# Patient Record
Sex: FEMALE | Race: WHITE | NOT HISPANIC OR LATINO | Employment: OTHER | URBAN - METROPOLITAN AREA
[De-identification: names, ages, dates, MRNs, and addresses within clinical notes are randomized per-mention and may not be internally consistent; named-entity substitution may affect disease eponyms.]

---

## 2021-08-31 PROBLEM — M35.01 SJOGREN'S SYNDROME WITH KERATOCONJUNCTIVITIS SICCA (HCC): Status: ACTIVE | Noted: 2021-08-31

## 2021-08-31 PROBLEM — M06.09 RHEUMATOID ARTHRITIS OF MULTIPLE SITES WITH NEGATIVE RHEUMATOID FACTOR (HCC): Status: ACTIVE | Noted: 2021-08-31

## 2021-08-31 PROBLEM — M47.816 SPONDYLOSIS OF LUMBAR REGION WITHOUT MYELOPATHY OR RADICULOPATHY: Status: ACTIVE | Noted: 2021-08-31

## 2021-08-31 PROBLEM — M81.0 AGE-RELATED OSTEOPOROSIS WITHOUT CURRENT PATHOLOGICAL FRACTURE: Status: ACTIVE | Noted: 2021-08-31

## 2021-08-31 PROBLEM — K21.9 GASTROESOPHAGEAL REFLUX DISEASE WITHOUT ESOPHAGITIS: Status: ACTIVE | Noted: 2018-05-14

## 2021-08-31 PROBLEM — I10 HYPERTENSION, ESSENTIAL: Status: ACTIVE | Noted: 2018-05-29

## 2021-08-31 PROBLEM — E78.5 HYPERLIPIDEMIA: Status: ACTIVE | Noted: 2018-04-10

## 2021-09-07 PROBLEM — M15.0 PRIMARY GENERALIZED (OSTEO)ARTHRITIS: Status: ACTIVE | Noted: 2021-09-07

## 2021-09-07 PROBLEM — G56.01 CARPAL TUNNEL SYNDROME OF RIGHT WRIST: Status: ACTIVE | Noted: 2021-09-07

## 2021-09-07 PROBLEM — M17.0 PRIMARY OSTEOARTHRITIS OF BOTH KNEES: Status: ACTIVE | Noted: 2021-09-07

## 2022-06-21 PROBLEM — M17.11 PRIMARY OSTEOARTHRITIS OF RIGHT KNEE: Status: ACTIVE | Noted: 2022-06-21

## 2022-10-12 PROBLEM — M35.01 SJOGREN'S SYNDROME WITH KERATOCONJUNCTIVITIS SICCA (HCC): Status: RESOLVED | Noted: 2021-08-31 | Resolved: 2022-10-12

## 2022-12-08 PROBLEM — M35.00 SJOGREN'S SYNDROME WITHOUT EXTRAGLANDULAR INVOLVEMENT (HCC): Status: ACTIVE | Noted: 2021-08-31

## 2022-12-08 PROBLEM — M35.00 SJOGREN'S SYNDROME WITHOUT EXTRAGLANDULAR INVOLVEMENT (HCC): Status: RESOLVED | Noted: 2021-08-31 | Resolved: 2022-10-12

## 2023-11-09 PROBLEM — M06.09 RHEUMATOID ARTHRITIS OF MULTIPLE SITES WITH NEGATIVE RHEUMATOID FACTOR (HCC): Status: ACTIVE | Noted: 2023-11-09

## 2024-01-16 RX ORDER — SODIUM CHLORIDE 9 MG/ML
20 INJECTION, SOLUTION INTRAVENOUS CONTINUOUS
Status: DISCONTINUED | OUTPATIENT
Start: 2024-01-18 | End: 2024-01-19 | Stop reason: HOSPADM

## 2024-01-18 ENCOUNTER — HOSPITAL ENCOUNTER (OUTPATIENT)
Dept: INFUSION CENTER | Facility: CLINIC | Age: 74
Discharge: HOME | End: 2024-01-18
Payer: MEDICARE

## 2024-01-18 VITALS
TEMPERATURE: 97 F | BODY MASS INDEX: 36.79 KG/M2 | DIASTOLIC BLOOD PRESSURE: 84 MMHG | SYSTOLIC BLOOD PRESSURE: 150 MMHG | RESPIRATION RATE: 18 BRPM | HEART RATE: 101 BPM | OXYGEN SATURATION: 98 % | WEIGHT: 173 LBS

## 2024-01-18 PROCEDURE — 96365 THER/PROPH/DIAG IV INF INIT: CPT

## 2024-01-18 RX ORDER — ANTIOX #8/OM3/DHA/EPA/LUT/ZEAX 250-2.5 MG
2 CAPSULE ORAL 2 TIMES DAILY
COMMUNITY

## 2024-01-18 RX ORDER — KETOTIFEN FUMARATE 0.25 MG/ML
1 SOLUTION/ DROPS OPHTHALMIC 2 TIMES DAILY
COMMUNITY

## 2024-01-18 RX ADMIN — SODIUM CHLORIDE 20 ML/HR: 0.9 INJECTION, SOLUTION INTRAVENOUS at 13:25

## 2024-01-18 RX ADMIN — GOLIMUMAB 150 MG: 50 SOLUTION INTRAVENOUS at 13:41

## 2024-01-18 NOTE — PROGRESS NOTES
Patient arrives for Stephen Crews today, has been receiving this at Dr Monroe's office. Intake assessment completed. PIV placed without issue, patient tolerated well. Next appointments scheduled with patient, AVS printed and provided. Patient resting on recliner chair, call bell within reach.

## 2024-01-18 NOTE — PROGRESS NOTES
Patient tolerated Simponi Aria without issue. PIV removed intact, coban wrap in place. Patient confirms next appt 3/14 at 0900, AVS in hand. Patient ambulatory upon DC.

## 2024-03-08 DIAGNOSIS — M06.09 RHEUMATOID ARTHRITIS OF MULTIPLE SITES WITHOUT RHEUMATOID FACTOR (HCC): Primary | ICD-10-CM

## 2024-03-08 RX ORDER — SODIUM CHLORIDE 9 MG/ML
20 INJECTION, SOLUTION INTRAVENOUS ONCE
Status: CANCELLED | OUTPATIENT
Start: 2024-03-14

## 2024-03-14 ENCOUNTER — HOSPITAL ENCOUNTER (OUTPATIENT)
Dept: INFUSION CENTER | Facility: CLINIC | Age: 74
Discharge: HOME | End: 2024-03-14
Payer: MEDICARE

## 2024-03-14 VITALS
RESPIRATION RATE: 18 BRPM | BODY MASS INDEX: 37.13 KG/M2 | OXYGEN SATURATION: 99 % | HEART RATE: 61 BPM | WEIGHT: 174.6 LBS | SYSTOLIC BLOOD PRESSURE: 118 MMHG | DIASTOLIC BLOOD PRESSURE: 71 MMHG | TEMPERATURE: 97.2 F

## 2024-03-14 DIAGNOSIS — M06.09 RHEUMATOID ARTHRITIS OF MULTIPLE SITES WITHOUT RHEUMATOID FACTOR (HCC): Primary | ICD-10-CM

## 2024-03-14 PROCEDURE — 96365 THER/PROPH/DIAG IV INF INIT: CPT

## 2024-03-14 RX ORDER — SODIUM CHLORIDE 9 MG/ML
20 INJECTION, SOLUTION INTRAVENOUS ONCE
OUTPATIENT
Start: 2024-05-09

## 2024-03-14 RX ORDER — SODIUM CHLORIDE 9 MG/ML
20 INJECTION, SOLUTION INTRAVENOUS ONCE
Status: COMPLETED | OUTPATIENT
Start: 2024-03-14 | End: 2024-03-14

## 2024-03-14 RX ADMIN — GOLIMUMAB 158.75 MG: 50 SOLUTION INTRAVENOUS at 09:51

## 2024-03-14 RX ADMIN — SODIUM CHLORIDE 20 ML/HR: 9 INJECTION, SOLUTION INTRAVENOUS at 09:17

## 2024-03-14 NOTE — PROGRESS NOTES
Pt resting with no complaints, vitals stable, no recent illness/antibiotic use,  call bell within reach. All questions answered and emotional support given. All needs met at this time.

## 2024-03-14 NOTE — PLAN OF CARE
Problem: Potential for Falls  Goal: Patient will remain free of falls  Description: INTERVENTIONS:  - Educate patient/family on patient safety including physical limitations  - Instruct patient to call for assistance with activity   - Consult OT/PT to assist with strengthening/mobility   - Keep Call bell within reach  - Keep bed low and locked with side rails adjusted as appropriate  - Keep care items and personal belongings within reach  - Initiate and maintain comfort rounds  \\  - Consider moving patient to room near nurses station  Outcome: Progressing

## 2024-03-19 ENCOUNTER — OFFICE VISIT (OUTPATIENT)
Age: 74
End: 2024-03-19
Payer: MEDICARE

## 2024-03-19 VITALS
DIASTOLIC BLOOD PRESSURE: 62 MMHG | WEIGHT: 174.6 LBS | BODY MASS INDEX: 36.65 KG/M2 | SYSTOLIC BLOOD PRESSURE: 118 MMHG | HEART RATE: 74 BPM | TEMPERATURE: 97.7 F | HEIGHT: 58 IN | OXYGEN SATURATION: 97 %

## 2024-03-19 DIAGNOSIS — Z79.899 ENCOUNTER FOR LONG-TERM (CURRENT) USE OF OTHER MEDICATIONS: ICD-10-CM

## 2024-03-19 DIAGNOSIS — M47.816 LUMBAR SPONDYLOSIS: ICD-10-CM

## 2024-03-19 DIAGNOSIS — M81.0 AGE-RELATED OSTEOPOROSIS WITHOUT CURRENT PATHOLOGICAL FRACTURE: ICD-10-CM

## 2024-03-19 DIAGNOSIS — M35.00 SJOGREN'S SYNDROME WITHOUT EXTRAGLANDULAR INVOLVEMENT (HCC): ICD-10-CM

## 2024-03-19 DIAGNOSIS — M06.09 RHEUMATOID ARTHRITIS OF MULTIPLE SITES WITHOUT RHEUMATOID FACTOR (HCC): Primary | ICD-10-CM

## 2024-03-19 DIAGNOSIS — M15.0 PRIMARY GENERALIZED (OSTEO)ARTHRITIS: ICD-10-CM

## 2024-03-19 PROCEDURE — 99214 OFFICE O/P EST MOD 30 MIN: CPT | Performed by: PHYSICIAN ASSISTANT

## 2024-03-19 PROCEDURE — 96372 THER/PROPH/DIAG INJ SC/IM: CPT | Performed by: PHYSICIAN ASSISTANT

## 2024-03-19 RX ORDER — SODIUM FLUORIDE 6 MG/ML
PASTE, DENTIFRICE DENTAL
Qty: 100 G | Refills: 3 | Status: SHIPPED | OUTPATIENT
Start: 2024-03-19

## 2024-03-19 NOTE — PROGRESS NOTES
"Assessment/Plan:    Silvia Ying came into the [facility] today 03/19/24 to receive Prolia injection.      Verbal consent obtained.  Consent given by: patient    patient states patient has been medically healthy with no underlining concerns/complications.      Silvia Ying presents with no symptoms today.       All insturctions were reviewed with the patient.    If the patient should have any questions/concerns, advised patient to contacted [facility].       Subjective:     History provided by: patient    Patient ID: Silvia Ying is a 74 y.o. female      Objective:    Vitals:    03/19/24 1420   BP: 118/62   BP Location: Right arm   Patient Position: Sitting   Cuff Size: Adult   Pulse: 74   Temp: 97.7 °F (36.5 °C)   TempSrc: Temporal   SpO2: 97%   Weight: 79.2 kg (174 lb 9.6 oz)   Height: 4' 10\" (1.473 m)       Patient tolerated the injection well without any complications.  Injection site/s upper right arm.  Medication was provided by office.    Patient signed consent form yes   Patient signed ABN form yes (If no patient is not a medicare patient).   Patient waited 15 minutes after injection no (This only applies to patient's receiving first time injection).       Last Visit: 3/12/2024  Next visit:8/6/2024     "

## 2024-03-19 NOTE — ASSESSMENT & PLAN NOTE
Osteoporosis currently being treated with Prolia.  She is due for dose #12 today.  She will be due for an updated DEXA scan in July.  We will continue to monitor her bone density every 2 years.

## 2024-03-19 NOTE — PROGRESS NOTES
Assessment/Plan:    Rheumatoid arthritis of multiple sites without rheumatoid factor (HCC)  Her rheumatoid arthritis is well-controlled with Simponi Aria infusions.  No signs of active inflammation or synovitis on exam today.  We will continue to monitor her response to treatment.  Labs as ordered to monitor for medication side effects and toxicity.  She is up-to-date with her QuantiFERON gold testing.  Follow-up in 3 to 4 months or sooner if needed.    Primary generalized (osteo)arthritis  Generalized osteoarthritis symptoms are stable.  Follow-up orthopedics as needed.    Age-related osteoporosis without current pathological fracture  Osteoporosis currently being treated with Prolia.  She is due for dose #12 today.  She will be due for an updated DEXA scan in July.  We will continue to monitor her bone density every 2 years.    Lumbar spondylosis  History of lumbar spondylosis.  Her symptoms are much improved after lumbar laminectomy and decompression in August 2022.    Sjogren's syndrome without extraglandular involvement (HCC)  Her sicca symptoms are generally stable.  Continue symptomatic treatment for dry eye and dry mouth symptoms.       Diagnoses and all orders for this visit:    Rheumatoid arthritis of multiple sites without rheumatoid factor (HCC)  -     CBC and differential; Standing  -     Comprehensive metabolic panel; Standing  -     Sedimentation rate, automated; Standing  -     C-reactive protein; Standing  -     Urinalysis with microscopic; Standing    Encounter for long-term (current) use of other medications  -     CBC and differential; Standing  -     Comprehensive metabolic panel; Standing  -     Sedimentation rate, automated; Standing  -     C-reactive protein; Standing  -     Urinalysis with microscopic; Standing    Age-related osteoporosis without current pathological fracture  -     DXA bone density spine hip and pelvis; Future  -     denosumab (PROLIA) subcutaneous injection 60  mg    Sjogren's syndrome without extraglandular involvement (HCC)  -     Sodium Fluoride (Sodium Fluoride 5000 PPM) 1.1 % PSTE; Apply on teeth every evening    Primary generalized (osteo)arthritis    Lumbar spondylosis        Spent 30 minutes total reviewing labs, chart notes, imaging studies, performing history and physical exam, discussing medication and treatment, counseling patient, and completing chart note.          Subjective:      Patient ID: Silvia Ying is a 74 y.o. female.    She is here for follow-up of her seronegative rheumatoid arthritis.  She has previously been on methotrexate, sulfasalazine, and Orencia.  She is now on Simponi Aria infusions.  She is feeling well and her rheumatoid arthritis symptoms are generally well-controlled.  She has minimal stiffness in the morning.  She has no swelling in her joints.  She has a history of sicca symptoms.  She is using PreviDent toothpaste for dry mouth symptoms.    She has a history of osteoarthritis.  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  She fractured her right little finger in November 2023 after a fall.  She had it splinted and did PT/OT.  During her therapy she began having worsening carpal tunnel symptoms.  She had a carpal tunnel repair done on 2/15/2024.  Her symptoms are much improved now.    She has a history of lumbar degenerative disc disease with radicular symptoms.  She underwent a lumbar laminectomy and decompression in August 2022.  She does still have some residual lower back pain with occasional pain into her right greater than left leg.    She has a history of osteoporosis with a history of a compression fracture.  She has previously been treated with bisphosphonates as well as Forteo.  She is now on Prolia.  She is due for dose #12 today.  She had a DEXA scan done on 07/06/2022.  Lumbar spine (L1-L2) T-score-1.2.  This has increased 4.1% as compared to her  "previous scan.  Left total hip T-score-2.4, femoral neck -2.1.  This has increased 1.3% as compared to her previous scan.  Right forearm T-score -1.5.  This has decreased 3.2% as compared to her previous scan.    She had labs done on 3/18/2024.  CBC essentially unremarkable.  Creatinine 0.91, GFR 66.  ESR, CRP within normal limits.  She is up-to-date with her QuantiFERON gold testing and had a negative test on 11/15/2023.      The following portions of the patient's history were reviewed and updated as appropriate: allergies, current medications, past family history, past medical history, past social history, past surgical history, and problem list.    Review of Systems   Constitutional:  Negative for chills, fatigue and fever.   HENT:  Negative for hearing loss, sore throat and tinnitus.    Eyes:  Negative for pain and visual disturbance.   Respiratory:  Negative for cough and shortness of breath.    Cardiovascular:  Negative for chest pain and palpitations.   Gastrointestinal:  Negative for abdominal pain, nausea and vomiting.   Genitourinary:  Negative for difficulty urinating.   Musculoskeletal:  Positive for arthralgias and back pain. Negative for gait problem, joint swelling, myalgias, neck pain and neck stiffness.   Skin:  Negative for rash.   Neurological:  Negative for dizziness, seizures, weakness, numbness and headaches.   Psychiatric/Behavioral:  Negative for confusion, decreased concentration and sleep disturbance.          Objective:      /62 (BP Location: Right arm, Patient Position: Sitting, Cuff Size: Adult)   Pulse 74   Temp 97.7 °F (36.5 °C) (Temporal)   Ht 4' 10\" (1.473 m)   Wt 79.2 kg (174 lb 9.6 oz)   SpO2 97%   BMI 36.49 kg/m²          Physical Exam  Constitutional:       Appearance: Normal appearance.   Cardiovascular:      Rate and Rhythm: Normal rate and regular rhythm.   Pulmonary:      Breath sounds: Normal breath sounds.   Musculoskeletal:      Comments: Neck decreased " lateral flexion.  Shoulders full range of motion.  Elbows hypermobile right with flexion contracture left.  Wrists very slightly decreased flexion.  Positive Tinel's right.  Slight thenar atrophy right.  Hands squaring 1st carpometacarpal joints bilaterally with Heberden's nodes with no synovitis appreciated.  Back dorsal kyphosis.  Straight leg raising negative bilaterally.  Hips full range of motion.  Knees surgical scar left.  Full range of motion.  Ankles full range of motion.  Feet hallux valgus deformities.  No synovitis noted.   Skin:     General: Skin is warm and dry.   Neurological:      General: No focal deficit present.      Mental Status: She is alert.         Dragon Dictation software was used to dictate this note. It may contain errors with dictating incorrect words/spelling. Please contact provider directly for any questions.

## 2024-03-19 NOTE — ASSESSMENT & PLAN NOTE
Her sicca symptoms are generally stable.  Continue symptomatic treatment for dry eye and dry mouth symptoms.

## 2024-03-19 NOTE — ASSESSMENT & PLAN NOTE
Her rheumatoid arthritis is well-controlled with Simponi Aria infusions.  No signs of active inflammation or synovitis on exam today.  We will continue to monitor her response to treatment.  Labs as ordered to monitor for medication side effects and toxicity.  She is up-to-date with her QuantiFERON gold testing.  Follow-up in 3 to 4 months or sooner if needed.

## 2024-03-19 NOTE — ASSESSMENT & PLAN NOTE
History of lumbar spondylosis.  Her symptoms are much improved after lumbar laminectomy and decompression in August 2022.

## 2024-05-09 ENCOUNTER — HOSPITAL ENCOUNTER (OUTPATIENT)
Dept: INFUSION CENTER | Facility: CLINIC | Age: 74
Discharge: HOME | End: 2024-05-09
Payer: MEDICARE

## 2024-05-09 VITALS — BODY MASS INDEX: 35.8 KG/M2 | WEIGHT: 171.3 LBS | TEMPERATURE: 97.3 F | OXYGEN SATURATION: 98 % | HEART RATE: 77 BPM

## 2024-05-09 DIAGNOSIS — M06.09 RHEUMATOID ARTHRITIS OF MULTIPLE SITES WITHOUT RHEUMATOID FACTOR (HCC): Primary | ICD-10-CM

## 2024-05-09 PROCEDURE — 96365 THER/PROPH/DIAG IV INF INIT: CPT

## 2024-05-09 RX ORDER — SODIUM CHLORIDE 9 MG/ML
20 INJECTION, SOLUTION INTRAVENOUS ONCE
OUTPATIENT
Start: 2024-07-04

## 2024-05-09 RX ORDER — SODIUM CHLORIDE 9 MG/ML
20 INJECTION, SOLUTION INTRAVENOUS ONCE
Status: COMPLETED | OUTPATIENT
Start: 2024-05-09 | End: 2024-05-09

## 2024-05-09 RX ADMIN — SODIUM CHLORIDE 20 ML/HR: 0.9 INJECTION, SOLUTION INTRAVENOUS at 09:50

## 2024-05-09 RX ADMIN — GOLIMUMAB 150 MG: 50 SOLUTION INTRAVENOUS at 09:46

## 2024-06-24 LAB
CRP SERPL-MCNC: 1.4 MG/L
ERYTHROCYTE [SEDIMENTATION RATE] IN BLOOD BY WESTERGREN METHOD: 14 MM/HR (ref 0–30)
GLUCOSE UR QL STRIP: NEGATIVE MG/DL
HGB UR QL STRIP: NEGATIVE MG/DL
KETONES UR QL STRIP: NEGATIVE MG/DL
LEUKOCYTE ESTERASE UR QL STRIP: 25 /UL
MUCOUS THREADS URNS QL MICRO: ABNORMAL
NITRITE UR QL STRIP: NEGATIVE
PH UR: 5 [PH] (ref 4.5–8)
PROT 24H UR-MRATE: NEGATIVE MG/DL
RBC #/AREA URNS HPF: ABNORMAL /HPF (ref 0–2)
SL AMB POCT URINE COMMENT: ABNORMAL
SP GR UR: 1.02 (ref 1–1.03)
SQUAMOUS #/AREA URNS HPF: >10 /LPF (ref 0–5)
TRANS CELLS #/AREA URNS HPF: ABNORMAL /LPF (ref 0–1)
WBC #/AREA URNS HPF: ABNORMAL /HPF (ref 0–5)

## 2024-07-05 ENCOUNTER — HOSPITAL ENCOUNTER (OUTPATIENT)
Dept: INFUSION CENTER | Facility: CLINIC | Age: 74
End: 2024-07-05
Payer: MEDICARE

## 2024-07-05 ENCOUNTER — HOSPITAL ENCOUNTER (OUTPATIENT)
Dept: INFUSION CENTER | Facility: CLINIC | Age: 74
Discharge: HOME | End: 2024-07-05

## 2024-07-05 VITALS
OXYGEN SATURATION: 95 % | TEMPERATURE: 97.9 F | HEART RATE: 88 BPM | SYSTOLIC BLOOD PRESSURE: 131 MMHG | WEIGHT: 174.16 LBS | BODY MASS INDEX: 36.4 KG/M2 | RESPIRATION RATE: 18 BRPM | DIASTOLIC BLOOD PRESSURE: 80 MMHG

## 2024-07-05 DIAGNOSIS — M06.09 RHEUMATOID ARTHRITIS OF MULTIPLE SITES WITHOUT RHEUMATOID FACTOR (HCC): Primary | ICD-10-CM

## 2024-07-05 PROCEDURE — 96365 THER/PROPH/DIAG IV INF INIT: CPT

## 2024-07-05 RX ORDER — SODIUM CHLORIDE 9 MG/ML
20 INJECTION, SOLUTION INTRAVENOUS ONCE
Status: COMPLETED | OUTPATIENT
Start: 2024-07-05 | End: 2024-07-05

## 2024-07-05 RX ORDER — SODIUM CHLORIDE 9 MG/ML
20 INJECTION, SOLUTION INTRAVENOUS ONCE
OUTPATIENT
Start: 2024-08-29

## 2024-07-05 RX ADMIN — SODIUM CHLORIDE 20 ML/HR: 0.9 INJECTION, SOLUTION INTRAVENOUS at 09:05

## 2024-07-05 RX ADMIN — GOLIMUMAB 150 MG: 50 SOLUTION INTRAVENOUS at 09:42

## 2024-07-05 NOTE — PROGRESS NOTES
Patient tolerated her treatment without any adverse reactions. Next appointment confirmed 8/30/24 at 0900 at [place]. Appointment card given

## 2024-07-05 NOTE — PROGRESS NOTES
Patient here for simponi aria, she is well, no c/o or changes to report, denies recent infection, had 2 teeth pulled for infection and was on antibiotic a month ago, all resolved.

## 2024-07-05 NOTE — PATIENT INSTRUCTIONS
July 2024 Sunday Monday Tuesday Wednesday Thursday Friday Saturday        1     2     3     4     5    INF THERAPY PLAN   9:00 AM   (90 min.)   [facility] INF CHAIR 14   [facility] 6       7     8     9     10     11     12     13       14     15     16     17     18     19     20       21     22     23     24     25     26     27       28     29     30     31

## 2024-08-02 ENCOUNTER — TELEPHONE (OUTPATIENT)
Age: 74
End: 2024-08-02

## 2024-08-06 ENCOUNTER — OFFICE VISIT (OUTPATIENT)
Age: 74
End: 2024-08-06
Payer: MEDICARE

## 2024-08-06 VITALS
TEMPERATURE: 97.8 F | DIASTOLIC BLOOD PRESSURE: 80 MMHG | WEIGHT: 177.2 LBS | SYSTOLIC BLOOD PRESSURE: 134 MMHG | BODY MASS INDEX: 37.2 KG/M2 | OXYGEN SATURATION: 98 % | HEART RATE: 72 BPM | HEIGHT: 58 IN

## 2024-08-06 DIAGNOSIS — M35.00 SJOGREN'S SYNDROME WITHOUT EXTRAGLANDULAR INVOLVEMENT (HCC): ICD-10-CM

## 2024-08-06 DIAGNOSIS — Z79.899 ENCOUNTER FOR LONG-TERM (CURRENT) USE OF OTHER MEDICATIONS: ICD-10-CM

## 2024-08-06 DIAGNOSIS — M06.09 RHEUMATOID ARTHRITIS OF MULTIPLE SITES WITHOUT RHEUMATOID FACTOR (HCC): Primary | ICD-10-CM

## 2024-08-06 DIAGNOSIS — M15.0 PRIMARY GENERALIZED (OSTEO)ARTHRITIS: ICD-10-CM

## 2024-08-06 DIAGNOSIS — M81.0 AGE-RELATED OSTEOPOROSIS WITHOUT CURRENT PATHOLOGICAL FRACTURE: ICD-10-CM

## 2024-08-06 DIAGNOSIS — M17.0 PRIMARY OSTEOARTHRITIS OF BOTH KNEES: ICD-10-CM

## 2024-08-06 DIAGNOSIS — M47.816 LUMBAR SPONDYLOSIS: ICD-10-CM

## 2024-08-06 DIAGNOSIS — R73.03 PREDIABETES: ICD-10-CM

## 2024-08-06 DIAGNOSIS — K21.9 GASTROESOPHAGEAL REFLUX DISEASE WITHOUT ESOPHAGITIS: ICD-10-CM

## 2024-08-06 PROCEDURE — 99214 OFFICE O/P EST MOD 30 MIN: CPT | Performed by: INTERNAL MEDICINE

## 2024-08-06 NOTE — PROGRESS NOTES
Assessment/Plan:    Rheumatoid arthritis of multiple sites without rheumatoid factor (HCC)  Rheumatoid arthritis well-controlled on Simponi Aria infusions every 8 weeks.  She had previously been on methotrexate, sulfasalazine, and Orencia.  Continue to monitor patient clinically for her response to therapy.  No sign of active inflammation or synovitis on exam at this visit.  Monitor disease activity and medication toxicity with lab work as ordered.  Follow-up 6 months or sooner if needed.    Primary generalized (osteo)arthritis  Patient is status post right total knee replacement 7/31/2023 with excellent postop course.  She does have a history of left total knee replacement in the past.  History lumbar spondylosis with prior decompression August 2022.  She does note neurogenic symptoms with distance walking, however, standing has improved.  She has occasional pain into her right greater than left lower extremity.  She has been in physical therapy for balance and strength with good benefit.  Continue home exercise program as tolerated.  Continue to monitor.    Primary osteoarthritis of both knees  Patient is status post bilateral total knee replacements with right knee arthroplasty 7/31/2023 with excellent postop course.  Follow-up orthopedic surgeon as scheduled.  Continue home exercise program as tolerated.  Continue to monitor.    Lumbar spondylosis  Lumbar spondylosis status post minimally invasive lumbar decompression in August 2022.  Marked improvement in radicular symptoms.  She has some neurogenic symptoms with longer walks but has less difficulty with standing.  Occasional pain into her right greater than left lower extremity.  She is in physical therapy for balance and strength with benefit thus far.  Continue home exercise program.  Continue to monitor.    Age-related osteoporosis without current pathological fracture  Osteoporosis currently being treated with Prolia. DEXA scan done on July 6, 2022  significant for osteopenia with improvement in lumbar spine and left hip. Patient has clinical osteoporosis with history of compression fracture previously treated with bisphosphonates and Forteo.  She had Prolia #12 in March 2024 and will be due for dose #13 around 9/20/2024.  Monitor her bone density every 2 years.  She does have follow-up DEXA scheduled.  Continue calcium and vitamin D supplement.  Practice fall prevention.  Check serum calcium and kidney function prior to her next Prolia injection.     Sjogren's syndrome without extraglandular involvement (HCC)  She is using eyedrops for dry eyes.  She is being followed by an eye specialist for age-related macular degeneration.  She is using Prevident toothpaste and XyliMelts, although she is not significantly bothered by xerostomia.  Encourage vigilant dental hygiene and dental visits every 6 months or sooner if needed. Continue to monitor.    Gastroesophageal reflux disease without esophagitis  Patient continues off of pantoprazole.  She uses Pepcid with excellent control of her reflux symptoms.  Continue to monitor.  Follow-up primary care.    Prediabetes  Prediabetes with stable hemoglobin A1c 5.8 on most recent study of 6/24/2024.  Encourage diet control and weight reduction in view of high BMI.  Continue to monitor.  Follow-up primary care.         Problem List Items Addressed This Visit       Rheumatoid arthritis of multiple sites without rheumatoid factor (HCC) - Primary     Rheumatoid arthritis well-controlled on Simponi Aria infusions every 8 weeks.  She had previously been on methotrexate, sulfasalazine, and Orencia.  Continue to monitor patient clinically for her response to therapy.  No sign of active inflammation or synovitis on exam at this visit.  Monitor disease activity and medication toxicity with lab work as ordered.  Follow-up 6 months or sooner if needed.         Relevant Orders    CBC and differential    Comprehensive metabolic panel     Sedimentation rate, automated    C-reactive protein    Urinalysis with microscopic    Quantiferon TB Gold Plus Assay    Age-related osteoporosis without current pathological fracture     Osteoporosis currently being treated with Prolia. DEXA scan done on July 6, 2022 significant for osteopenia with improvement in lumbar spine and left hip. Patient has clinical osteoporosis with history of compression fracture previously treated with bisphosphonates and Forteo.  She had Prolia #12 in March 2024 and will be due for dose #13 around 9/20/2024.  Monitor her bone density every 2 years.  She does have follow-up DEXA scheduled.  Continue calcium and vitamin D supplement.  Practice fall prevention.  Check serum calcium and kidney function prior to her next Prolia injection.          Sjogren's syndrome without extraglandular involvement (HCC)     She is using eyedrops for dry eyes.  She is being followed by an eye specialist for age-related macular degeneration.  She is using Prevident toothpaste and XyliMelts, although she is not significantly bothered by xerostomia.  Encourage vigilant dental hygiene and dental visits every 6 months or sooner if needed. Continue to monitor.         Lumbar spondylosis     Lumbar spondylosis status post minimally invasive lumbar decompression in August 2022.  Marked improvement in radicular symptoms.  She has some neurogenic symptoms with longer walks but has less difficulty with standing.  Occasional pain into her right greater than left lower extremity.  She is in physical therapy for balance and strength with benefit thus far.  Continue home exercise program.  Continue to monitor.         Gastroesophageal reflux disease without esophagitis     Patient continues off of pantoprazole.  She uses Pepcid with excellent control of her reflux symptoms.  Continue to monitor.  Follow-up primary care.         Primary generalized (osteo)arthritis     Patient is status post right total knee replacement  7/31/2023 with excellent postop course.  She does have a history of left total knee replacement in the past.  History lumbar spondylosis with prior decompression August 2022.  She does note neurogenic symptoms with distance walking, however, standing has improved.  She has occasional pain into her right greater than left lower extremity.  She has been in physical therapy for balance and strength with good benefit.  Continue home exercise program as tolerated.  Continue to monitor.         Primary osteoarthritis of both knees     Patient is status post bilateral total knee replacements with right knee arthroplasty 7/31/2023 with excellent postop course.  Follow-up orthopedic surgeon as scheduled.  Continue home exercise program as tolerated.  Continue to monitor.         Prediabetes     Prediabetes with stable hemoglobin A1c 5.8 on most recent study of 6/24/2024.  Encourage diet control and weight reduction in view of high BMI.  Continue to monitor.  Follow-up primary care.          Other Visit Diagnoses       Encounter for long-term (current) use of other medications                    Reviewed records, labs, and imaging with the patient in detail.  Counseled patient.  Discussion regarding my findings and recommendations.  Office visit with documentation 35 min.    Subjective:      Patient ID: Silvia Ying is a 74 y.o. female.    Patient presents for follow-up of her seronegative rheumatoid arthritis.  She has previously been on methotrexate, sulfasalazine, and Orencia.  She is now on Simponi Aria infusions.  She is feeling well and her rheumatoid arthritis symptoms are generally well-controlled.  She has minimal stiffness in the morning.  She has no swelling in her joints.  She has a history of sicca symptoms.  She is using PreviDent toothpaste for dry mouth symptoms. Dental visit in June was without decay. She is using OTC Thera tears for dry eyes. She is being followed for cataracts.    She has a history of  osteoarthritis.  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  She fractured her right little finger in November 2023 after a fall.  She had it splinted and did PT/OT.  During her therapy she began having worsening carpal tunnel symptoms.  She had a carpal tunnel repair done on 2/15/2024.  Her symptoms are much improved now and she has fully recovered from the surgery.    She has a history of lumbar degenerative disc disease with radicular symptoms.  She underwent a lumbar laminectomy and decompression in August 2022.  She does still have some residual lower back pain with occasional pain into her right greater than left leg.  She is in physical therapy for balance and strength with good benefit thus far.    She has a history of osteoporosis with a history of a compression fracture.  She has previously been treated with bisphosphonates as well as Forteo.  She is now on Prolia.  She had dose #12 3/19/2024 and is due for dose #13 in September.  She had a DEXA scan done on 07/06/2022.  Lumbar spine (L1-L2) T-score-1.2.  This has increased 4.1% as compared to her previous scan.  Left total hip T-score-2.4, femoral neck -2.1.  This has increased 1.3% as compared to her previous scan.  Right forearm T-score -1.5.  This has decreased 3.2% as compared to her previous scan.    She had labs done on 6/24/2024 significant for sed rate 14.  CRP 1.4.  CBC with slightly elevated absolute eosinophils of 0.6. calcium 9.0.  Creatinine 0.79 with estimated GFR 78.  TSH 2.20.  Total cholesterol 190.  Triglyceride 103.  HDL 72.  LDL 97.  Hemoglobin A1c 5.8.  Vitamin D 47.  Urinalysis benign.  QuantiFERON gold dated 11/15/2023 was negative.  Review of lab work dated 3/18/2024.  CBC essentially unremarkable.  Creatinine 0.91, GFR 66.  ESR, CRP within normal limits.  She is up-to-date with her QuantiFERON gold testing and had a negative test on  11/15/2023.        Allergies  Allergies   Allergen Reactions    Penicillins Anaphylaxis    Vancomycin Other (See Comments)     Burning of the tongue       Home Medications    Current Outpatient Medications:     acetaminophen (TYLENOL) 500 mg tablet, Take 500 mg by mouth every 6 (six) hours as needed, Disp: , Rfl:     Calcium-Phosphorus-Vitamin D (CITRACAL +D3 PO), Take by mouth, Disp: , Rfl:     Cholecalciferol (Vitamin D3) 50 MCG (2000 UT) TABS, Take 2,000 Units by mouth daily, Disp: , Rfl:     Denosumab (PROLIA SC), Inject under the skin, Disp: , Rfl:     famotidine (PEPCID) 40 MG tablet, Take 40 mg by mouth daily, Disp: , Rfl:     Golimumab (SIMPONI ARIA IV), Inject into a catheter in a vein, Disp: , Rfl:     ketotifen (ZADITOR) 0.025 % ophthalmic solution, 1 drop 2 (two) times a day, Disp: , Rfl:     losartan (COZAAR) 100 MG tablet, Take 100 mg by mouth daily, Disp: , Rfl:     Multiple Vitamin (multivitamin) tablet, Take 1 tablet by mouth daily, Disp: , Rfl:     Multiple Vitamins-Minerals (PreserVision AREDS 2) CAPS, Take 2 capsules by mouth 2 (two) times a day, Disp: , Rfl:     Sodium Fluoride (Sodium Fluoride 5000 PPM) 1.1 % PSTE, Apply on teeth every evening, Disp: 100 g, Rfl: 3    amLODIPine (NORVASC) 5 mg tablet, Take 5 mg by mouth daily, Disp: , Rfl:     atorvastatin (LIPITOR) 10 mg tablet, Take 10 mg by mouth daily, Disp: , Rfl:     Current Facility-Administered Medications:     denosumab (PROLIA) subcutaneous injection 60 mg, 60 mg, Subcutaneous, Q6 Months, , 60 mg at 09/24/24 1635    Past Medical History  Past Medical History:   Diagnosis Date    Depression     Esophageal reflux     Hyperlipidemia     Injury of right little finger 11/2023    Lumbar spondylosis     Osteoarthritis     Rheumatoid arthritis (HCC)     Senile osteoporosis     Sjogren's syndrome (HCC)     Venous insufficiency        Past Surgical History   Past Surgical History:   Procedure Laterality Date    BREAST CYST EXCISION Right      "CARPAL TUNNEL RELEASE Right 02/15/2024    ELBOW SURGERY      HAND SURGERY      left wrist fx ORIF    JOINT REPLACEMENT      PELVIC LAPAROSCOPY         Family History   Family History   Problem Relation Age of Onset    Heart disease Mother     Stroke Father     Cancer Daughter        The following portions of the patient's history were reviewed and updated as appropriate: allergies, current medications, past family history, past medical history, past social history, past surgical history, and problem list.    Review of Systems   Constitutional:  Negative for chills, fatigue and fever.   HENT:  Negative for hearing loss, sore throat and tinnitus.         Dry mouth not bothersome.  Using Prevident toothpaste.   Eyes:  Negative for pain and visual disturbance.        Dry eyes using eye drops  AMD (dry) with slight progression per retina specialist   Respiratory:  Negative for cough and shortness of breath.    Cardiovascular:  Negative for chest pain and palpitations.   Gastrointestinal:  Negative for abdominal pain, nausea and vomiting.   Genitourinary:  Negative for difficulty urinating.   Musculoskeletal:  Positive for arthralgias and back pain. Negative for gait problem, joint swelling, myalgias, neck pain and neck stiffness.   Skin:  Negative for rash.   Neurological:  Negative for dizziness, seizures, weakness, numbness and headaches.   Psychiatric/Behavioral:  Negative for confusion, decreased concentration and sleep disturbance.          Objective:      /80 (BP Location: Right arm, Patient Position: Sitting, Cuff Size: Large)   Pulse 72   Temp 97.8 °F (36.6 °C) (Temporal)   Ht 4' 9.75\" (1.467 m)   Wt 80.4 kg (177 lb 3.2 oz)   SpO2 98%   BMI 37.36 kg/m²          Physical Exam  Vitals reviewed.   Constitutional:       Appearance: Normal appearance.   HENT:      Head: Normocephalic.      Nose:      Comments: Nose and throat unremarkable.  Eyes:      Extraocular Movements: Extraocular movements intact. "   Neck:      Comments: Without masses, thyromegaly, lymphadenopathy  Cardiovascular:      Rate and Rhythm: Normal rate and regular rhythm.   Pulmonary:      Breath sounds: Normal breath sounds.   Abdominal:      Palpations: Abdomen is soft.   Musculoskeletal:      Cervical back: Neck supple.      Comments: Neck decreased lateral flexion.  Shoulders full range of motion.  Elbows hypermobile right with flexion contracture left.  Wrists very slightly decreased flexion.  Positive Tinel's right.  Slight thenar atrophy right.  Hands squaring 1st carpometacarpal joints bilaterally with Heberden's nodes with no synovitis appreciated.  Back dorsal kyphosis.  Straight leg raising negative bilaterally.  Hips full range of motion.  Knees surgical scar bilateral knees.  Full range of motion.  Ankles full range of motion.  Feet hallux valgus deformities.  No synovitis noted.   Skin:     General: Skin is warm and dry.   Neurological:      General: No focal deficit present.      Mental Status: She is alert.               This note was written in part using the assistance of the [employer] [id number] microphone system. Those portions using this system have been dictated and not read.

## 2024-08-30 ENCOUNTER — HOSPITAL ENCOUNTER (OUTPATIENT)
Dept: INFUSION CENTER | Facility: CLINIC | Age: 74
End: 2024-08-30
Payer: MEDICARE

## 2024-08-30 VITALS
HEART RATE: 73 BPM | BODY MASS INDEX: 37.1 KG/M2 | TEMPERATURE: 97.5 F | SYSTOLIC BLOOD PRESSURE: 127 MMHG | DIASTOLIC BLOOD PRESSURE: 83 MMHG | OXYGEN SATURATION: 98 % | WEIGHT: 176 LBS | RESPIRATION RATE: 18 BRPM

## 2024-08-30 DIAGNOSIS — M06.09 RHEUMATOID ARTHRITIS OF MULTIPLE SITES WITHOUT RHEUMATOID FACTOR (HCC): Primary | ICD-10-CM

## 2024-08-30 PROCEDURE — 96365 THER/PROPH/DIAG IV INF INIT: CPT

## 2024-08-30 RX ORDER — SODIUM CHLORIDE 9 MG/ML
20 INJECTION, SOLUTION INTRAVENOUS ONCE
Status: COMPLETED | OUTPATIENT
Start: 2024-08-30 | End: 2024-08-30

## 2024-08-30 RX ORDER — SODIUM CHLORIDE 9 MG/ML
20 INJECTION, SOLUTION INTRAVENOUS ONCE
OUTPATIENT
Start: 2024-10-25

## 2024-08-30 RX ADMIN — GOLIMUMAB 160 MG: 50 SOLUTION INTRAVENOUS at 10:09

## 2024-08-30 RX ADMIN — SODIUM CHLORIDE 20 ML/HR: 0.9 INJECTION, SOLUTION INTRAVENOUS at 09:27

## 2024-08-30 NOTE — PROGRESS NOTES
Patient tolerated treatment without incident. Peripheral IV removed. Next appointment confirmed for 10/25/2024 at 0900. AVS offered and declined.

## 2024-08-30 NOTE — PROGRESS NOTES
Patient presents to the [facility]fusion [facility] for the treatment of Simponi Aria. She declines any recent infections or antibiotic use. She is resting comfortably in the chair, call bell within reach.

## 2024-09-24 ENCOUNTER — CLINICAL SUPPORT (OUTPATIENT)
Age: 74
End: 2024-09-24
Payer: MEDICARE

## 2024-09-24 VITALS
HEART RATE: 64 BPM | OXYGEN SATURATION: 99 % | TEMPERATURE: 98.1 F | DIASTOLIC BLOOD PRESSURE: 72 MMHG | WEIGHT: 172.6 LBS | SYSTOLIC BLOOD PRESSURE: 122 MMHG | BODY MASS INDEX: 36.39 KG/M2

## 2024-09-24 DIAGNOSIS — M81.0 AGE-RELATED OSTEOPOROSIS WITHOUT CURRENT PATHOLOGICAL FRACTURE: Primary | ICD-10-CM

## 2024-09-24 PROCEDURE — 96372 THER/PROPH/DIAG INJ SC/IM: CPT

## 2024-09-24 NOTE — PROGRESS NOTES
Assessment/Plan:    Silvia Ying came into the [facility] Rheumatology Office today 09/24/24 to receive Prolia injection.      Verbal consent obtained.  Consent given by: patient    patient states patient has been medically healthy with no underlining concerns/complications.      Silvia Ying presents with no symptoms today.       All insturctions were reviewed with the patient.    If the patient should have any questions/concerns, advised patient to contacted [facility] Rheumatology Office.       Subjective:     History provided by: patient    Patient ID: Silvia Ying is a 74 y.o. female      Objective:    Vitals:    09/24/24 0954   BP: 122/72   BP Location: Right arm   Patient Position: Sitting   Cuff Size: Adult   Pulse: 64   Temp: 98.1 °F (36.7 °C)   TempSrc: Temporal   SpO2: 99%   Weight: 78.3 kg (172 lb 9.6 oz)       Patient tolerated the injection well without any complications.  Injection site/s upper right arm.  Medication was provided by office.    Patient signed consent form yes   Patient signed ABN form yes (If no patient is not a medicare patient).   Patient waited 15 minutes after injection no (This only applies to patient's receiving first time injection).       Last Visit: 8/25/2024  Next visit:Visit date not found

## 2024-10-02 ENCOUNTER — TELEPHONE (OUTPATIENT)
Age: 74
End: 2024-10-02

## 2024-10-02 NOTE — TELEPHONE ENCOUNTER
Patient called asking if we could please mail her a copy of her 'Quantiferon TB Gold Plus Assay' Lab Work to her home address. Please advise

## 2024-10-04 ENCOUNTER — TELEPHONE (OUTPATIENT)
Age: 74
End: 2024-10-04

## 2024-10-04 NOTE — TELEPHONE ENCOUNTER
Sheila from [facility] is calling to ask if we can fax over the patient's dxa scan script to their office since patient is scheduled there.      Fax: [phone]    Thank you.

## 2024-10-25 ENCOUNTER — HOSPITAL ENCOUNTER (OUTPATIENT)
Dept: INFUSION CENTER | Facility: CLINIC | Age: 74
End: 2024-10-25
Payer: MEDICARE

## 2024-10-25 VITALS
WEIGHT: 178.5 LBS | RESPIRATION RATE: 18 BRPM | DIASTOLIC BLOOD PRESSURE: 79 MMHG | TEMPERATURE: 96.9 F | HEART RATE: 76 BPM | SYSTOLIC BLOOD PRESSURE: 133 MMHG | OXYGEN SATURATION: 99 % | BODY MASS INDEX: 37.63 KG/M2

## 2024-10-25 DIAGNOSIS — M06.09 RHEUMATOID ARTHRITIS OF MULTIPLE SITES WITHOUT RHEUMATOID FACTOR (HCC): Primary | ICD-10-CM

## 2024-10-25 PROCEDURE — 96365 THER/PROPH/DIAG IV INF INIT: CPT

## 2024-10-25 RX ORDER — SODIUM CHLORIDE 9 MG/ML
20 INJECTION, SOLUTION INTRAVENOUS ONCE
OUTPATIENT
Start: 2024-12-20

## 2024-10-25 RX ORDER — SODIUM CHLORIDE 9 MG/ML
20 INJECTION, SOLUTION INTRAVENOUS ONCE
Status: COMPLETED | OUTPATIENT
Start: 2024-10-25 | End: 2024-10-25

## 2024-10-25 RX ADMIN — GOLIMUMAB 162.5 MG: 50 SOLUTION INTRAVENOUS at 10:02

## 2024-10-25 RX ADMIN — SODIUM CHLORIDE 20 ML/HR: 0.9 INJECTION, SOLUTION INTRAVENOUS at 09:12

## 2024-10-25 NOTE — PROGRESS NOTES
Patient tolerated Simponi Aria without issue. PIV removed intact, coban wrap in place. Patient confirms next appt at [facility] 12/20 at 0900, declines AVS.

## 2024-10-25 NOTE — PROGRESS NOTES
Pt arrives to infusion center for [facility] Crews. Offers no complaints, no recent illness/infection, or antibiotic use. PIV accessed without issue. Pt sitting comfortably in chair, wheels locked, call bell within reach.

## 2024-11-03 PROBLEM — R73.03 PREDIABETES: Status: ACTIVE | Noted: 2024-11-03

## 2024-11-04 NOTE — ASSESSMENT & PLAN NOTE
She is using eyedrops for dry eyes.  She is being followed by an eye specialist for age-related macular degeneration.  She is using Prevident toothpaste and XyliMelts, although she is not significantly bothered by xerostomia.  Encourage vigilant dental hygiene and dental visits every 6 months or sooner if needed. Continue to monitor.

## 2024-11-04 NOTE — ASSESSMENT & PLAN NOTE
Patient is status post bilateral total knee replacements with right knee arthroplasty 7/31/2023 with excellent postop course.  Follow-up orthopedic surgeon as scheduled.  Continue home exercise program as tolerated.  Continue to monitor.

## 2024-11-04 NOTE — ASSESSMENT & PLAN NOTE
Lumbar spondylosis status post minimally invasive lumbar decompression in August 2022.  Marked improvement in radicular symptoms.  She has some neurogenic symptoms with longer walks but has less difficulty with standing.  Occasional pain into her right greater than left lower extremity.  She is in physical therapy for balance and strength with benefit thus far.  Continue home exercise program.  Continue to monitor.

## 2024-11-04 NOTE — ASSESSMENT & PLAN NOTE
Patient continues off of pantoprazole.  She uses Pepcid with excellent control of her reflux symptoms.  Continue to monitor.  Follow-up primary care.

## 2024-11-04 NOTE — ASSESSMENT & PLAN NOTE
Rheumatoid arthritis well-controlled on Simponi Aria infusions every 8 weeks.  She had previously been on methotrexate, sulfasalazine, and Orencia.  Continue to monitor patient clinically for her response to therapy.  No sign of active inflammation or synovitis on exam at this visit.  Monitor disease activity and medication toxicity with lab work as ordered.  Follow-up 6 months or sooner if needed.

## 2024-11-04 NOTE — ASSESSMENT & PLAN NOTE
Prediabetes with stable hemoglobin A1c 5.8 on most recent study of 6/24/2024.  Encourage diet control and weight reduction in view of high BMI.  Continue to monitor.  Follow-up primary care.

## 2024-11-04 NOTE — ASSESSMENT & PLAN NOTE
Patient is status post right total knee replacement 7/31/2023 with excellent postop course.  She does have a history of left total knee replacement in the past.  History lumbar spondylosis with prior decompression August 2022.  She does note neurogenic symptoms with distance walking, however, standing has improved.  She has occasional pain into her right greater than left lower extremity.  She has been in physical therapy for balance and strength with good benefit.  Continue home exercise program as tolerated.  Continue to monitor.

## 2024-11-04 NOTE — ASSESSMENT & PLAN NOTE
Osteoporosis currently being treated with Prolia. DEXA scan done on July 6, 2022 significant for osteopenia with improvement in lumbar spine and left hip. Patient has clinical osteoporosis with history of compression fracture previously treated with bisphosphonates and Forteo.  She had Prolia #12 in March 2024 and will be due for dose #13 around 9/20/2024.  Monitor her bone density every 2 years.  She does have follow-up DEXA scheduled.  Continue calcium and vitamin D supplement.  Practice fall prevention.  Check serum calcium and kidney function prior to her next Prolia injection.

## 2024-12-20 ENCOUNTER — HOSPITAL ENCOUNTER (OUTPATIENT)
Dept: INFUSION CENTER | Facility: CLINIC | Age: 74
End: 2024-12-20
Payer: MEDICARE

## 2024-12-20 VITALS
RESPIRATION RATE: 16 BRPM | TEMPERATURE: 97.3 F | HEART RATE: 97 BPM | OXYGEN SATURATION: 99 % | SYSTOLIC BLOOD PRESSURE: 121 MMHG | BODY MASS INDEX: 37.52 KG/M2 | WEIGHT: 178 LBS | DIASTOLIC BLOOD PRESSURE: 72 MMHG

## 2024-12-20 DIAGNOSIS — M06.09 RHEUMATOID ARTHRITIS OF MULTIPLE SITES WITHOUT RHEUMATOID FACTOR (HCC): Primary | ICD-10-CM

## 2024-12-20 PROCEDURE — 96365 THER/PROPH/DIAG IV INF INIT: CPT

## 2024-12-20 RX ORDER — SODIUM CHLORIDE 9 MG/ML
20 INJECTION, SOLUTION INTRAVENOUS ONCE
OUTPATIENT
Start: 2025-02-14

## 2024-12-20 RX ORDER — SODIUM CHLORIDE 9 MG/ML
20 INJECTION, SOLUTION INTRAVENOUS ONCE
Status: COMPLETED | OUTPATIENT
Start: 2024-12-20 | End: 2024-12-20

## 2024-12-20 RX ADMIN — SODIUM CHLORIDE 20 ML/HR: 0.9 INJECTION, SOLUTION INTRAVENOUS at 09:28

## 2024-12-20 RX ADMIN — GOLIMUMAB 161.25 MG: 50 SOLUTION INTRAVENOUS at 09:37

## 2024-12-20 NOTE — PROGRESS NOTES
Patient tolerated treatment today without complications. Patient scheduled next appointment for 2/14 at 1000. Appointment card provided.

## 2024-12-20 NOTE — PROGRESS NOTES
Patient arrived for next Simponi Aria infusion. Offers no complaints. Patient denies any recent infections or antibiotic use.

## 2025-02-14 ENCOUNTER — HOSPITAL ENCOUNTER (OUTPATIENT)
Dept: INFUSION CENTER | Facility: CLINIC | Age: 75
End: 2025-02-14
Payer: MEDICARE

## 2025-02-14 VITALS
OXYGEN SATURATION: 100 % | SYSTOLIC BLOOD PRESSURE: 152 MMHG | TEMPERATURE: 97.7 F | HEART RATE: 52 BPM | DIASTOLIC BLOOD PRESSURE: 74 MMHG | RESPIRATION RATE: 16 BRPM | BODY MASS INDEX: 37.95 KG/M2 | WEIGHT: 180 LBS

## 2025-02-14 DIAGNOSIS — M06.09 RHEUMATOID ARTHRITIS OF MULTIPLE SITES WITHOUT RHEUMATOID FACTOR (HCC): Primary | ICD-10-CM

## 2025-02-14 PROCEDURE — 96365 THER/PROPH/DIAG IV INF INIT: CPT

## 2025-02-14 RX ORDER — SODIUM CHLORIDE 9 MG/ML
20 INJECTION, SOLUTION INTRAVENOUS ONCE
Status: COMPLETED | OUTPATIENT
Start: 2025-02-14 | End: 2025-02-14

## 2025-02-14 RX ORDER — SODIUM CHLORIDE 9 MG/ML
20 INJECTION, SOLUTION INTRAVENOUS ONCE
OUTPATIENT
Start: 2025-04-11

## 2025-02-14 RX ADMIN — GOLIMUMAB 163.75 MG: 50 SOLUTION INTRAVENOUS at 10:39

## 2025-02-14 RX ADMIN — SODIUM CHLORIDE 20 ML/HR: 0.9 INJECTION, SOLUTION INTRAVENOUS at 10:34

## 2025-02-14 NOTE — PROGRESS NOTES
Pt at [facility] for Stephen Crews. PIV 24 gauge to right a.c infusing per mar. Pt denies any recent infection or abx use. Pt has no further questions at this time. Call bell within reach.

## 2025-03-27 ENCOUNTER — OFFICE VISIT (OUTPATIENT)
Age: 75
End: 2025-03-27
Payer: MEDICARE

## 2025-03-27 VITALS
BODY MASS INDEX: 37.99 KG/M2 | HEIGHT: 58 IN | WEIGHT: 181 LBS | HEART RATE: 86 BPM | OXYGEN SATURATION: 97 % | SYSTOLIC BLOOD PRESSURE: 124 MMHG | DIASTOLIC BLOOD PRESSURE: 86 MMHG

## 2025-03-27 DIAGNOSIS — M81.0 AGE-RELATED OSTEOPOROSIS WITHOUT CURRENT PATHOLOGICAL FRACTURE: ICD-10-CM

## 2025-03-27 DIAGNOSIS — M15.0 PRIMARY GENERALIZED (OSTEO)ARTHRITIS: ICD-10-CM

## 2025-03-27 DIAGNOSIS — M17.0 PRIMARY OSTEOARTHRITIS OF BOTH KNEES: ICD-10-CM

## 2025-03-27 DIAGNOSIS — M47.816 LUMBAR SPONDYLOSIS: ICD-10-CM

## 2025-03-27 DIAGNOSIS — K21.9 GASTROESOPHAGEAL REFLUX DISEASE WITHOUT ESOPHAGITIS: ICD-10-CM

## 2025-03-27 DIAGNOSIS — M35.00 SJOGREN'S SYNDROME WITHOUT EXTRAGLANDULAR INVOLVEMENT (HCC): ICD-10-CM

## 2025-03-27 DIAGNOSIS — M06.09 RHEUMATOID ARTHRITIS OF MULTIPLE SITES WITHOUT RHEUMATOID FACTOR (HCC): Primary | ICD-10-CM

## 2025-03-27 PROCEDURE — 96372 THER/PROPH/DIAG INJ SC/IM: CPT | Performed by: PHYSICIAN ASSISTANT

## 2025-03-27 PROCEDURE — 99214 OFFICE O/P EST MOD 30 MIN: CPT | Performed by: INTERNAL MEDICINE

## 2025-03-27 RX ORDER — SODIUM FLUORIDE 6 MG/ML
PASTE, DENTIFRICE DENTAL
Qty: 100 G | Refills: 3 | Status: SHIPPED | OUTPATIENT
Start: 2025-03-27

## 2025-03-27 NOTE — PROGRESS NOTES
Assessment/Plan:    Rheumatoid arthritis of multiple sites without rheumatoid factor (HCC)  Rheumatoid arthritis well-controlled on Simponi Aria infusions every 8 weeks.  She had previously been on methotrexate, sulfasalazine, and Orencia. No sign of active inflammation or synovitis on exam at this visit.  Will continue to monitor the patient clinically for her response to therapy.  Monitor disease activity and medication toxicity with lab work as ordered.  Monitor QuantiFERON gold yearly.  She will be due for updated QuantiFERON gold in late December 2025.  Follow-up 6 months or sooner if needed.    Primary generalized (osteo)arthritis  Primary generalized osteoarthritis with bilateral total knee replacements, with more recent right total knee replacement 7/31/2023 with excellent postop course.  History lumbar spondylosis with prior decompression August 2022.  She does have some neurogenic symptoms with distance walking but has no difficulty with standing.  She has no current radicular symptoms.  She did have physical therapy for balance and strength with excellent results.  Encouraged home exercise program as tolerated.  Continue to monitor.    Lumbar spondylosis  History of lumbar spondylosis status post minimally invasive lumbar decompression August 2022 with marked improvement postoperatively.  She has mild neurogenic symptoms with distance walking, but has no difficulty with standing and has no current radicular symptoms.  She completed physical therapy for balance and strength.  Encouraged home exercise program as tolerated.  Continue to monitor.    Sjogren's syndrome without extraglandular involvement (HCC)  Sjogren syndrome, treated symptomatically.  She does use over-the-counter tears for dry eyes and is being followed by ophthalmologist for age-related macular degeneration.  For xerostomia she has been using PreviDent toothpaste and XyliMelts, however she does not have significant complaints of dry mouth.   Continue to encourage vigilant dental hygiene and follow-up with dentist every 6 months or sooner if needed.  Continue to monitor.    Primary osteoarthritis of both knees  DJD bilateral knees status post bilateral total knee replacements, with more recent surgery 7/31/2023 for right knee arthroplasty with excellent postop course.  Follow-up orthopedic surgery if needed.  Continue home exercise program as tolerated.  Continue to monitor.    Age-related osteoporosis without current pathological fracture  Osteoporosis being treated with Prolia.  Patient has clinical osteoporosis with history compression fracture previously treated with bisphosphonates and Forteo.  She is due for Prolia No. 14 at this visit.  Most recent DEXA dated 10/22/2024 significant for osteopenia with no comparison able to be done as it was done at a different facility than the prior DEXA.  This will be her new baseline.  Will monitor DEXA every 2 years.  Continue calcium and vitamin D supplement and home exercise program as tolerated.  Practice fall prevention.  Will continue to check serum calcium and kidney function prior to each dose of Prolia.  Follow-up 6 months or sooner if needed.    Gastroesophageal reflux disease without esophagitis  GERD symptoms stable with famotidine 40 mg daily.  Follow-up primary care.  Continue to monitor.         Problem List Items Addressed This Visit     Rheumatoid arthritis of multiple sites without rheumatoid factor (HCC) - Primary    Rheumatoid arthritis well-controlled on Simponi Aria infusions every 8 weeks.  She had previously been on methotrexate, sulfasalazine, and Orencia. No sign of active inflammation or synovitis on exam at this visit.  Will continue to monitor the patient clinically for her response to therapy.  Monitor disease activity and medication toxicity with lab work as ordered.  Monitor QuantiFERON gold yearly.  She will be due for updated QuantiFERON gold in late December 2025.  Follow-up  6 months or sooner if needed.         Relevant Orders    CBC and differential    Comprehensive metabolic panel    Sedimentation rate, automated    C-reactive protein    Urinalysis with microscopic    QuantiFERON-TB Gold Plus [facility]    Age-related osteoporosis without current pathological fracture    Osteoporosis being treated with Prolia.  Patient has clinical osteoporosis with history compression fracture previously treated with bisphosphonates and Forteo.  She is due for Prolia No. 14 at this visit.  Most recent DEXA dated 10/22/2024 significant for osteopenia with no comparison able to be done as it was done at a different facility than the prior DEXA.  This will be her new baseline.  Will monitor DEXA every 2 years.  Continue calcium and vitamin D supplement and home exercise program as tolerated.  Practice fall prevention.  Will continue to check serum calcium and kidney function prior to each dose of Prolia.  Follow-up 6 months or sooner if needed.         Sjogren's syndrome without extraglandular involvement (HCC)    Sjogren syndrome, treated symptomatically.  She does use over-the-counter tears for dry eyes and is being followed by ophthalmologist for age-related macular degeneration.  For xerostomia she has been using PreviDent toothpaste and XyliMelts, however she does not have significant complaints of dry mouth.  Continue to encourage vigilant dental hygiene and follow-up with dentist every 6 months or sooner if needed.  Continue to monitor.         Relevant Medications    Sodium Fluoride (Sodium Fluoride 5000 PPM) 1.1 % PSTE    Lumbar spondylosis    History of lumbar spondylosis status post minimally invasive lumbar decompression August 2022 with marked improvement postoperatively.  She has mild neurogenic symptoms with distance walking, but has no difficulty with standing and has no current radicular symptoms.  She completed physical therapy for balance and strength.  Encouraged home exercise program  as tolerated.  Continue to monitor.         Gastroesophageal reflux disease without esophagitis    GERD symptoms stable with famotidine 40 mg daily.  Follow-up primary care.  Continue to monitor.         Primary generalized (osteo)arthritis    Primary generalized osteoarthritis with bilateral total knee replacements, with more recent right total knee replacement 7/31/2023 with excellent postop course.  History lumbar spondylosis with prior decompression August 2022.  She does have some neurogenic symptoms with distance walking but has no difficulty with standing.  She has no current radicular symptoms.  She did have physical therapy for balance and strength with excellent results.  Encouraged home exercise program as tolerated.  Continue to monitor.         Primary osteoarthritis of both knees    DJD bilateral knees status post bilateral total knee replacements, with more recent surgery 7/31/2023 for right knee arthroplasty with excellent postop course.  Follow-up orthopedic surgery if needed.  Continue home exercise program as tolerated.  Continue to monitor.                  Reviewed records, labs, and imaging with the patient in detail.  Counseled patient.  Discussion regarding my findings and recommendations.  Office visit with documentation 35 min.    Subjective:      Patient ID: Silvia Ying is a 75 y.o. female.    Patient presents for follow-up of her seronegative rheumatoid arthritis.  She has previously been on methotrexate, sulfasalazine, and Orencia.  She is now on Simponi Aria infusions.  She is feeling well and her rheumatoid arthritis symptoms are generally well-controlled.  She has minimal stiffness in the morning.  She has no swelling in her joints.  She has a history of sicca symptoms.  She is using PreviDent toothpaste for dry mouth symptoms. Dental visit last June was without decay. She is using OTC Thera tears for dry eyes. She is being followed for cataracts.    She has a history of  osteoarthritis.  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  She fractured her right little finger in November 2023 after a fall.  She had it splinted and did PT/OT.  During her therapy she began having worsening carpal tunnel symptoms.  She had a carpal tunnel repair done on 2/15/2024.  Her symptoms are much improved now and she has fully recovered from the surgery.    She has a history of lumbar degenerative disc disease with radicular symptoms.  She underwent a lumbar laminectomy and decompression in August 2022.  Has no current radicular symptoms.  She did complete physical therapy for balance and strength with benefit.    She has a history of osteoporosis with a history of a compression fracture.  She has previously been treated with bisphosphonates as well as Forteo.  She is currently on Prolia and is due for dose #14 at this office visit.  She had a DEXA scan done on 10/22/2024 significant for L1 and L2 T-score +0.1.  Left total hip T-score -1.9.  Left femoral neck T-score -1.8.  Left forearm T-score -1.6.  Comparison could not be completed as it was done at a different facility than her prior study.  Review of DEXA dated 07/06/2022 significant for lumbar spine (L1-L2) T-score-1.2.  This has increased 4.1% as compared to her previous scan.  Left total hip T-score-2.4, femoral neck -2.1.  This has increased 1.3% as compared to her previous scan.  Right forearm T-score -1.5.  This has decreased 3.2% as compared to her previous scan.    Lab work dated 1/31/2025 significant for hematocrit borderline high at 43.1.  Eosinophils slightly high at 0.6%.  Sed rate 21.  CRP 1.6.  Random glucose 100.  Calcium 10.1.  Estimated GFR 65.  Urinalysis benign.  QuantiFERON gold negative on 12/26/2024.  She had labs done on 6/24/2024 significant for sed rate 14.  CRP 1.4.  CBC with slightly elevated absolute eosinophils of 0.6. calcium 9.0.  Creatinine 0.79 with  estimated GFR 78.  TSH 2.20.  Total cholesterol 190.  Triglyceride 103.  HDL 72.  LDL 97.  Hemoglobin A1c 5.8.  Vitamin D 47.  Urinalysis benign.  QuantiFERON gold dated 11/15/2023 was negative.  Review of lab work dated 3/18/2024.  CBC essentially unremarkable.  Creatinine 0.91, GFR 66.  ESR, CRP within normal limits.  She is up-to-date with her QuantiFERON gold testing and had a negative test on 11/15/2023.        Allergies  Allergies   Allergen Reactions   • Penicillins Anaphylaxis     Anaphylaxis   • Vancomycin Other (See Comments)     Burning of the tongue       Home Medications    Current Outpatient Medications:   •  acetaminophen (TYLENOL) 500 mg tablet, Take 500 mg by mouth every 6 (six) hours as needed, Disp: , Rfl:   •  CALCIUM CITRATE-VITAMIN D3 PO, Take by mouth, Disp: , Rfl:   •  Calcium-Phosphorus-Vitamin D (CITRACAL +D3 PO), Take by mouth, Disp: , Rfl:   •  Cholecalciferol (Vitamin D3) 50 MCG (2000 UT) TABS, Take 2,000 Units by mouth daily, Disp: , Rfl:   •  Denosumab (PROLIA SC), Inject under the skin, Disp: , Rfl:   •  famotidine (PEPCID) 40 MG tablet, Take 40 mg by mouth daily, Disp: , Rfl:   •  Golimumab (SIMPONI ARIA IV), Inject into a catheter in a vein, Disp: , Rfl:   •  losartan (COZAAR) 100 MG tablet, Take 100 mg by mouth daily, Disp: , Rfl:   •  Multiple Vitamin (multivitamin) tablet, Take 1 tablet by mouth daily, Disp: , Rfl:   •  Multiple Vitamins-Minerals (PreserVision AREDS 2) CAPS, Take 2 capsules by mouth 2 (two) times a day, Disp: , Rfl:   •  Sodium Fluoride (Sodium Fluoride 5000 PPM) 1.1 % PSTE, Apply on teeth every evening, Disp: 100 g, Rfl: 3  •  amLODIPine (NORVASC) 5 mg tablet, Take 5 mg by mouth daily, Disp: , Rfl:   •  atorvastatin (LIPITOR) 10 mg tablet, Take 10 mg by mouth daily, Disp: , Rfl:     Past Medical History  Past Medical History:   Diagnosis Date   • Depression    • Esophageal reflux    • Hyperlipidemia    • Injury of right little finger 11/2023   • Lumbar  "spondylosis    • Osteoarthritis    • Rheumatoid arthritis (HCC)    • Senile osteoporosis    • Sjogren's syndrome (HCC)    • Venous insufficiency        Past Surgical History   Past Surgical History:   Procedure Laterality Date   • BREAST CYST EXCISION Right    • CARPAL TUNNEL RELEASE Right 02/15/2024   • ELBOW SURGERY     • HAND SURGERY      left wrist fx ORIF   • JOINT REPLACEMENT     • PELVIC LAPAROSCOPY         Family History   Family History   Problem Relation Age of Onset   • Heart disease Mother    • Stroke Father    • Cancer Daughter        The following portions of the patient's history were reviewed and updated as appropriate: allergies, current medications, past family history, past medical history, past social history, past surgical history, and problem list.    Review of Systems   Constitutional:  Negative for chills, fatigue and fever.   HENT:  Negative for hearing loss, sore throat and tinnitus.         Dry mouth not bothersome.  Using Prevident toothpaste.   Eyes:  Negative for pain and visual disturbance.        Dry eyes using eye drops  AMD (dry) with slight progression per retina specialist   Respiratory:  Negative for cough and shortness of breath.    Cardiovascular:  Negative for chest pain and palpitations.   Gastrointestinal:  Negative for abdominal pain, nausea and vomiting.   Genitourinary:  Negative for difficulty urinating.   Musculoskeletal:  Positive for arthralgias and back pain. Negative for gait problem, joint swelling, myalgias, neck pain and neck stiffness.   Skin:  Negative for rash.   Neurological:  Negative for dizziness, seizures, weakness, numbness and headaches.   Psychiatric/Behavioral:  Negative for confusion, decreased concentration and sleep disturbance.          Objective:      /86   Pulse 86   Ht 4' 9.75\" (1.467 m)   Wt 82.1 kg (181 lb)   SpO2 97%   BMI 38.16 kg/m²          Physical Exam  Vitals reviewed.   Constitutional:       Appearance: Normal appearance. "   HENT:      Head: Normocephalic.      Nose:      Comments: Nose and throat unremarkable.  Eyes:      Extraocular Movements: Extraocular movements intact.   Neck:      Comments: Without masses, thyromegaly, lymphadenopathy  Cardiovascular:      Rate and Rhythm: Normal rate and regular rhythm.   Pulmonary:      Breath sounds: Normal breath sounds.   Abdominal:      Palpations: Abdomen is soft.   Musculoskeletal:      Cervical back: Neck supple.      Comments: Neck decreased lateral flexion.  Shoulders full range of motion.  Elbows hypermobile right with flexion contracture left.  Wrists very slightly decreased flexion.  Positive Tinel's right.  Slight thenar atrophy right.  Hands squaring 1st carpometacarpal joints bilaterally with Heberden's nodes with no synovitis appreciated.  Back dorsal kyphosis.  Straight leg raising negative bilaterally.  Hips full range of motion.  Knees surgical scar bilateral knees.  Full range of motion.  Ankles full range of motion.  Feet hallux valgus deformities.  No synovitis noted.   Skin:     General: Skin is warm and dry.   Neurological:      General: No focal deficit present.      Mental Status: She is alert.               This note was written in part using the assistance of the [employer] [id number] microphone system. Those portions using this system have been dictated and not read.

## 2025-03-27 NOTE — PATIENT INSTRUCTIONS
Continue your medications as before.  Continue with the PreviDent toothpaste.  Continue calcium and vitamin D supplement.  I gave you a new slip for lab work.  You will need a follow-up DEXA until the end of October 2026.  We will see you back in follow-up in 6 months for your office visit and Prolia.

## 2025-03-27 NOTE — PROGRESS NOTES
"Assessment/Plan:    Silvia Ying came into the [facility] today 03/27/25 to receive Prolia injection.      Verbal consent obtained.  Consent given by: patient    patient states patient has been medically healthy with no underlining concerns/complications.      Silvia Ying presents with no symptoms today.       All insturctions were reviewed with the patient.    If the patient should have any questions/concerns, advised patient to contacted [facility].       Subjective:     History provided by: patient    Patient ID: Silvia Ying is a 75 y.o. female      Objective:    Vitals:    03/27/25 1040   Weight: 82.1 kg (181 lb)   Height: 4' 9.75\" (1.467 m)       Patient tolerated the injection well without any complications.  Injection site/s right arm.  Medication was provided by provider stock.  Specialty Pharmacy Name -     Patient signed consent form no   Patient signed ABN form no (If no patient is not a medicare patient).   Patient waited 15 minutes after injection no (This only applies to patient's receiving first time injection).       Last Visit: 2/11/2025  Next visit:Visit date not found     "

## 2025-04-01 NOTE — ASSESSMENT & PLAN NOTE
DJD bilateral knees status post bilateral total knee replacements, with more recent surgery 7/31/2023 for right knee arthroplasty with excellent postop course.  Follow-up orthopedic surgery if needed.  Continue home exercise program as tolerated.  Continue to monitor.

## 2025-04-01 NOTE — ASSESSMENT & PLAN NOTE
Osteoporosis being treated with Prolia.  Patient has clinical osteoporosis with history compression fracture previously treated with bisphosphonates and Forteo.  She is due for Prolia No. 14 at this visit.  Most recent DEXA dated 10/22/2024 significant for osteopenia with no comparison able to be done as it was done at a different facility than the prior DEXA.  This will be her new baseline.  Will monitor DEXA every 2 years.  Continue calcium and vitamin D supplement and home exercise program as tolerated.  Practice fall prevention.  Will continue to check serum calcium and kidney function prior to each dose of Prolia.  Follow-up 6 months or sooner if needed.

## 2025-04-01 NOTE — ASSESSMENT & PLAN NOTE
Sjogren syndrome, treated symptomatically.  She does use over-the-counter tears for dry eyes and is being followed by ophthalmologist for age-related macular degeneration.  For xerostomia she has been using PreviDent toothpaste and XyliMelts, however she does not have significant complaints of dry mouth.  Continue to encourage vigilant dental hygiene and follow-up with dentist every 6 months or sooner if needed.  Continue to monitor.

## 2025-04-01 NOTE — ASSESSMENT & PLAN NOTE
Rheumatoid arthritis well-controlled on Simponi Aria infusions every 8 weeks.  She had previously been on methotrexate, sulfasalazine, and Orencia. No sign of active inflammation or synovitis on exam at this visit.  Will continue to monitor the patient clinically for her response to therapy.  Monitor disease activity and medication toxicity with lab work as ordered.  Monitor QuantiFERON gold yearly.  She will be due for updated QuantiFERON gold in late December 2025.  Follow-up 6 months or sooner if needed.

## 2025-04-01 NOTE — ASSESSMENT & PLAN NOTE
History of lumbar spondylosis status post minimally invasive lumbar decompression August 2022 with marked improvement postoperatively.  She has mild neurogenic symptoms with distance walking, but has no difficulty with standing and has no current radicular symptoms.  She completed physical therapy for balance and strength.  Encouraged home exercise program as tolerated.  Continue to monitor.

## 2025-04-01 NOTE — ASSESSMENT & PLAN NOTE
Primary generalized osteoarthritis with bilateral total knee replacements, with more recent right total knee replacement 7/31/2023 with excellent postop course.  History lumbar spondylosis with prior decompression August 2022.  She does have some neurogenic symptoms with distance walking but has no difficulty with standing.  She has no current radicular symptoms.  She did have physical therapy for balance and strength with excellent results.  Encouraged home exercise program as tolerated.  Continue to monitor.

## 2025-04-11 ENCOUNTER — HOSPITAL ENCOUNTER (OUTPATIENT)
Dept: INFUSION CENTER | Facility: CLINIC | Age: 75
End: 2025-04-11
Payer: MEDICARE

## 2025-04-11 VITALS
HEART RATE: 90 BPM | DIASTOLIC BLOOD PRESSURE: 82 MMHG | RESPIRATION RATE: 18 BRPM | OXYGEN SATURATION: 99 % | BODY MASS INDEX: 37.52 KG/M2 | TEMPERATURE: 96.8 F | SYSTOLIC BLOOD PRESSURE: 131 MMHG | WEIGHT: 178 LBS

## 2025-04-11 DIAGNOSIS — M06.09 RHEUMATOID ARTHRITIS OF MULTIPLE SITES WITHOUT RHEUMATOID FACTOR (HCC): Primary | ICD-10-CM

## 2025-04-11 PROCEDURE — 96365 THER/PROPH/DIAG IV INF INIT: CPT

## 2025-04-11 RX ORDER — SODIUM CHLORIDE 9 MG/ML
20 INJECTION, SOLUTION INTRAVENOUS ONCE
Status: COMPLETED | OUTPATIENT
Start: 2025-04-11 | End: 2025-04-11

## 2025-04-11 RX ORDER — SODIUM CHLORIDE 9 MG/ML
20 INJECTION, SOLUTION INTRAVENOUS ONCE
OUTPATIENT
Start: 2025-06-06

## 2025-04-11 RX ADMIN — SODIUM CHLORIDE 20 ML/HR: 0.9 INJECTION, SOLUTION INTRAVENOUS at 09:54

## 2025-04-11 RX ADMIN — GOLIMUMAB 161.25 MG: 50 SOLUTION INTRAVENOUS at 10:28

## 2025-04-11 NOTE — PROGRESS NOTES
Pt tolerated treatment without incident. Confirmed next apt for 6/6/25 @ 9:30am @ [facility].

## 2025-06-02 ENCOUNTER — TELEPHONE (OUTPATIENT)
Age: 75
End: 2025-06-02

## 2025-06-02 ENCOUNTER — TELEPHONE (OUTPATIENT)
Dept: INFUSION CENTER | Facility: CLINIC | Age: 75
End: 2025-06-02

## 2025-06-02 NOTE — TELEPHONE ENCOUNTER
Patient had dental work done Tuesday 5/272025 and had to take ABX prophetically and will be done with the ABX Tuesday 6/3/2025.     Patient wanted to make sure it is okay to still have her infusion on Friday June 6, 2025.     Please advise.

## 2025-06-02 NOTE — TELEPHONE ENCOUNTER
Patient called to inform infusion that she is currently taking antibiotics and her last dosage is to be taken tomorrow 6/3. Patient would like to know if she is still cleared to receive treatment on Friday 6/6 or will she has to reschedule. Please advise

## 2025-06-03 NOTE — TELEPHONE ENCOUNTER
Spoke with patient, advised that per Dr. Monroe she can have the infusion as scheduled. Patient verbalized understanding.

## 2025-06-06 ENCOUNTER — HOSPITAL ENCOUNTER (OUTPATIENT)
Dept: INFUSION CENTER | Facility: CLINIC | Age: 75
End: 2025-06-06
Payer: MEDICARE

## 2025-06-06 VITALS
SYSTOLIC BLOOD PRESSURE: 105 MMHG | DIASTOLIC BLOOD PRESSURE: 69 MMHG | WEIGHT: 177 LBS | BODY MASS INDEX: 37.31 KG/M2 | OXYGEN SATURATION: 96 % | TEMPERATURE: 97 F | HEART RATE: 96 BPM | RESPIRATION RATE: 16 BRPM

## 2025-06-06 DIAGNOSIS — M06.09 RHEUMATOID ARTHRITIS OF MULTIPLE SITES WITHOUT RHEUMATOID FACTOR (HCC): Primary | ICD-10-CM

## 2025-06-06 PROCEDURE — 96365 THER/PROPH/DIAG IV INF INIT: CPT

## 2025-06-06 RX ORDER — SODIUM CHLORIDE 9 MG/ML
20 INJECTION, SOLUTION INTRAVENOUS ONCE
OUTPATIENT
Start: 2025-08-01

## 2025-06-06 RX ORDER — SODIUM CHLORIDE 9 MG/ML
20 INJECTION, SOLUTION INTRAVENOUS ONCE
Status: COMPLETED | OUTPATIENT
Start: 2025-06-06 | End: 2025-06-06

## 2025-06-06 RX ADMIN — GOLIMUMAB 160 MG: 50 SOLUTION INTRAVENOUS at 10:10

## 2025-06-06 RX ADMIN — SODIUM CHLORIDE 20 ML/HR: 9 INJECTION, SOLUTION INTRAVENOUS at 09:38

## 2025-08-01 ENCOUNTER — HOSPITAL ENCOUNTER (OUTPATIENT)
Dept: INFUSION CENTER | Facility: CLINIC | Age: 75
Discharge: HOME | End: 2025-08-01
Attending: INTERNAL MEDICINE
Payer: MEDICARE

## 2025-08-01 VITALS
HEART RATE: 84 BPM | BODY MASS INDEX: 36.47 KG/M2 | TEMPERATURE: 98.1 F | DIASTOLIC BLOOD PRESSURE: 81 MMHG | OXYGEN SATURATION: 96 % | WEIGHT: 173 LBS | SYSTOLIC BLOOD PRESSURE: 130 MMHG

## 2025-08-01 DIAGNOSIS — M06.09 RHEUMATOID ARTHRITIS OF MULTIPLE SITES WITHOUT RHEUMATOID FACTOR (HCC): Primary | ICD-10-CM

## 2025-08-01 PROCEDURE — 96365 THER/PROPH/DIAG IV INF INIT: CPT

## 2025-08-01 RX ORDER — SODIUM CHLORIDE 9 MG/ML
20 INJECTION, SOLUTION INTRAVENOUS ONCE
OUTPATIENT
Start: 2025-09-26

## 2025-08-01 RX ORDER — SODIUM CHLORIDE 9 MG/ML
20 INJECTION, SOLUTION INTRAVENOUS ONCE
Status: COMPLETED | OUTPATIENT
Start: 2025-08-01 | End: 2025-08-01

## 2025-08-01 RX ADMIN — SODIUM CHLORIDE 20 ML/HR: 0.9 INJECTION, SOLUTION INTRAVENOUS at 08:35

## 2025-08-01 RX ADMIN — GOLIMUMAB 150 MG: 50 SOLUTION INTRAVENOUS at 09:02
